# Patient Record
Sex: FEMALE | Race: WHITE
[De-identification: names, ages, dates, MRNs, and addresses within clinical notes are randomized per-mention and may not be internally consistent; named-entity substitution may affect disease eponyms.]

---

## 2019-04-29 ENCOUNTER — HOSPITAL ENCOUNTER (OUTPATIENT)
Dept: HOSPITAL 96 - M.SUR | Age: 49
Discharge: HOME | End: 2019-04-29
Attending: SURGERY
Payer: COMMERCIAL

## 2019-04-29 DIAGNOSIS — Z90.711: ICD-10-CM

## 2019-04-29 DIAGNOSIS — Z79.899: ICD-10-CM

## 2019-04-29 DIAGNOSIS — Z79.891: ICD-10-CM

## 2019-04-29 DIAGNOSIS — I10: ICD-10-CM

## 2019-04-29 DIAGNOSIS — D24.1: Primary | ICD-10-CM

## 2019-04-29 DIAGNOSIS — Z98.890: ICD-10-CM

## 2019-04-29 LAB
ALBUMIN SERPL-MCNC: 3.6 G/DL (ref 3.4–5)
ALP SERPL-CCNC: 57 U/L (ref 46–116)
ALT SERPL-CCNC: 22 U/L (ref 30–65)
ANION GAP SERPL CALC-SCNC: 9 MMOL/L (ref 7–16)
AST SERPL-CCNC: 11 U/L (ref 15–37)
BILIRUB SERPL-MCNC: 0.3 MG/DL
BUN SERPL-MCNC: 9 MG/DL (ref 7–18)
CALCIUM SERPL-MCNC: 8.6 MG/DL (ref 8.5–10.1)
CHLORIDE SERPL-SCNC: 106 MMOL/L (ref 98–107)
CO2 SERPL-SCNC: 24 MMOL/L (ref 21–32)
CREAT SERPL-MCNC: 0.8 MG/DL (ref 0.6–1.3)
GLUCOSE SERPL-MCNC: 115 MG/DL (ref 70–99)
HCT VFR BLD CALC: 41.7 % (ref 37–47)
HGB BLD-MCNC: 14.3 GM/DL (ref 12–15)
MCH RBC QN AUTO: 31 PG (ref 26–34)
MCHC RBC AUTO-ENTMCNC: 34.4 G/DL (ref 28–37)
MCV RBC: 90.2 FL (ref 80–100)
MPV: 8.2 FL. (ref 7.2–11.1)
PLATELET COUNT*: 240 THOU/UL (ref 150–400)
POTASSIUM SERPL-SCNC: 3.7 MMOL/L (ref 3.5–5.1)
PROT SERPL-MCNC: 7 G/DL (ref 6.4–8.2)
RBC # BLD AUTO: 4.63 MIL/UL (ref 4.2–5)
RDW-CV: 13 % (ref 10.5–14.5)
SODIUM SERPL-SCNC: 139 MMOL/L (ref 136–145)
WBC # BLD AUTO: 10.3 THOU/UL (ref 4–11)

## 2019-04-29 NOTE — EKG
[place] [address]
Phone:  [phone]                     ELECTROCARDIOGRAM REPORT      
_______________________________________________________________________________
 
Name:       YVESCARROL             Room:                      [facility]#:  [id number]      Account #:      [id number]  
Admission:  19     Attend Phys:    Qing Santiago MD   
Discharge:               Date of Birth:  [birth year]  
         Report #: [id number]
    [id number]
_______________________________________________________________________________
THIS REPORT FOR:  //name//                      
 
                          [facility]
                                       
Test Date:    2019               Test Time:    07:27:54
Pat Name:     JAMES MARINELLI             Department:   
Patient ID:   [id number]            Room:          
Gender:       F                        Technician:   [facility]
:          1970               Requested By: Qing Santiago
Order Number: [id number]    Reading MD:   Harshil Cordova
                                 Measurements
Intervals                              Axis          
Rate:         63                       P:            43
CA:           142                      QRS:          10
QRSD:         85                       T:            30
QT:           419                                    
QTc:          429                                    
                           Interpretive Statements
Sinus rhythm
Low voltage, precordial leads
Compared to ECG 2015 13:30:09
Low QRS voltage now present
Sinus bradycardia no longer present
 
Electronically Signed On 2019 12:20:56 CDT by Harshil Cordova
[email or web address]
 
 
 
 
 
 
 
 
 
 
 
 
 
 
 
 
 
  <ELECTRONICALLY SIGNED>
                                           By: Harshil Cordova MD, [facility]      
  19     1220
D: 04/29/19 07   _____________________________________
T: 19   Harshil Cordova MD, [facility]        /EPI

## 2019-04-29 NOTE — OP
[address]  [address]                    OPERATIVE REPORT              
_______________________________________________________________________________
 
Name:       YVESJAMES LAURA             Room:                      [facility]#:  [id number]      Account #:      [id number]  
Admission:  04/29/19     Attend Phys:    Qing Santiago MD   
Discharge:               Date of Birth:  [birth year]  
         Report #: [id number]
                                                                     [id number]  
_______________________________________________________________________________
THIS REPORT FOR:  //name//                      
 
CC: Almaz Santiago
 
DATE OF SERVICE:  04/29/2019
 
 
PREOPERATIVE DIAGNOSIS:  Right breast intraductal papilloma.
 
POSTOPERATIVE DIAGNOSIS:  Right breast intraductal papilloma.
 
PROCEDURE:  Right breast needle localized excision of papilloma.
 
SURGEON:  Qing Santiago MD.
 
ASSISTANT:  SCOTT Deng.
 
COMPLICATIONS:  None.
 
ANESTHESIA:  General anesthesia.
 
ESTIMATED BLOOD LOSS:  5 mL.
 
SPECIMENS REMOVED:  Right breast papilloma.
 
OTHER:  Incision 5 cm in length, 3 cm from nipple, 7 o'clock position,
periareolar.  Clip and the lesion in the mammographic specimen.
 
INDICATIONS:  The patient is a 48-year-old female with imaging in 03/14/2019 and
03/19/2019 showing a tubular hypoechoic focus of duct ectasia with a nonmobile
echo at 6 o'clock right breast 5 cm from nipple, also a lesion at the 8 o'clock,
5 cm from nipple.  Biopsy on 03/22/2019 at [facility] of 6 o'clock was
fibrocystic change and small sclerosing papilloma and 8 o'clock dilated duct
with other benign changes.  Risks and benefits of excision of the 6 o'clock
lesion, papilloma were discussed with the patient.
 
Indications for excision of papilloma were discussed with the patient and
recommendations to do so.  Risks and benefits for such were delineated in the H
and P and the patient agreed to proceed.
 
DESCRIPTION OF PROCEDURE:  The patient was brought to the operating room after
informed consent had been obtained.  Preoperatively, she was taken to mammogram
for wire localization of the 6 o'clock area.  The patient was then placed under
general anesthesia in the supine position with the right arm extended.  The
right breast and axilla were then prepped and draped in normal sterile manner. 
 
 
 
[place] [address]                    OPERATIVE REPORT              
_______________________________________________________________________________
 
Name:       JAMES MARINELLI             Room:                      [facility].#:  [id number]      Account #:      [id number]  
Admission:  04/29/19     Attend Phys:    Qing Santiago MD   
Discharge:               Date of Birth:  [birth year]  
         Report #: [id number]
                                                                     [id number]  
_______________________________________________________________________________
Prior to skin incision, a combination of 1% lidocaine plain and 0.5% Marcaine
with epinephrine was used.  A periareolar skin incision was made with a knife. 
This was deepened into the subcutaneous tissues using the Bovie electrocautery. 
A path was created toward the wire exit site in the superficial mastectomy plane
using the Bovie electrocautery.  Once the wire was identified, it was grasped
with 2 hemostats and brought into the incision.  The Bovie electrocautery was
then used to excise a lump of tissue surrounding the pathway of the wire.  The
biopsy site was encountered during dissection and the dissection was adjusted to
account for this.  Once completely removed, the specimen was labeled for
orientation purposes.  It was placed in the mammographic specimen tray and
handed for mammographic evaluation.  Mammogram did confirm that the lesion and
the clip were indeed within the specimen.  The wound bed was copiously irrigated
with normal saline, was noted to be adequately hemostatic.  The deeper tissues
were reapproximated with a single interrupted suture of 3-0 Vicryl.  The
Garcia's ligaments were released to take off skin puckering related to this
suture.  The deep dermal layers were then closed with interrupted 3-0 Vicryl
suture.  Skin was closed with 4-0 Monocryl in a subcuticular manner.  The wound
was dressed with Dermabond dressing.  The patient tolerated the procedure well. 
Sponge, lap and needle counts were correct x 2 at the end of the procedure.  She
was transferred to recovery in stable condition.
 
 
 
 
 
 
 
 
 
 
 
 
 
 
 
 
 
 
 
 
 
 
 
 
                       
                                        By:                                
                 
D: 04/29/19 1147_______________________________________
T: 04/29/19 1644Minsandeep Santiago MD               /nt

## 2019-05-01 NOTE — PATH
[address]  [address]                    PATHOLOGY RPT PROCEDURE       
_______________________________________________________________________________
 
Name:       MAKENNA MARINELLI MARIE             Room:                      [facility].R.#:  [id number]      Account #:      [id number]  
Admission:  04/29/19     Date of Birth:  [birth year]  
Discharge:                             Report #:    [id number]
                                                         Path Case #: [id number]
_______________________________________________________________________________
 
[facility] Accession Number: [id number]
.                                                                01
Material submitted:                                        .
breast - RIGHT BREAST PAPILLOMA, LONG LATERAL SHORT SUPERIOR DOUBLE DEEP.
Modifiers: right
.                                                                01
Clinical history:                                          .
Right breast papilloma
.                                                                02
**********************************************************************
Diagnosis:
Right breast papilloma:
- Benign breast tissue with residual sclerosing papilloma associated with
calcification and adjacent to changes of prior biopsy, negative for
atypia.  See comment.
(WANDA:pit 05/01/2019)
QTP/05/01/2019
**********************************************************************
.                                                                02
Comment:
The papilloma appears to be completely removed.
(WANDA:pit 05/01/2019)
.                                                                02
Electronically signed:                                     .
Shane Crain MD, Pathologist
NPI- [id number]
.                                                                01
Gross description:                                         .
The specimen is received in formalin, labeled "Makenna Marinelli, right
breast papilloma, long lateral, short superior, double deep", is a 7 g,
oriented fibroadipose tissue with sutures: short = superior, long =
lateral and double = deep.  The specimen measures medial to lateral = 3.5
cm, superior to inferior = 2.2 cm and superficial to deep = 1.3 cm.  The
specimen is inked as follows: Superior = red, inferior = green, medial =
orange, lateral = yellow, superficial = blue and deep = black.  The
specimen is serially sectioned from medial to lateral into 11 slices.
(Slice #1 medial and slice #11 lateral margins) to reveal a tan-brown,
focally hemorrhagic, indurated mass with a possible previous biopsy site
approximately measuring 3.0 x 2.2 x 1.3 cm (slice #3 to slice #11).  The
lesion abuts superficial, deep, superior, inferior, and lateral margins and
is 0.6 cm from the medial margin.  The remaining parenchyma is yellow and
fatty.
.
The specimen is entirely submitted as follows:
A1.  Slice 1, medial margin, perpendicular sectioned
A2-A9.  Midportion, contiguous sections (slice #2 to #10) with possible
previous biopsy site in A5-A6.
A10.  Slice 11, lateral margin, perpendicular [place] [address]                    PATHOLOGY RPT PROCEDURE       
_______________________________________________________________________________
 
Name:       MAKENNA MARINELLI             Room:                      [facility]#:  [id number]      Account #:      [id number]  
Admission:  04/29/19     Date of Birth:  [birth year]  
Discharge:                             Report #:    [id number]
                                                         Path Case #: [id number]
_______________________________________________________________________________
.
Specimen excised at: Not provided, placed in formalin at: Not provided,
formalin exposure: Cannot be determined.
(SWS; 4/30/2019)
SHS/SHS
.                                                                02
Pathologist provided ICD-10:
D24.1
.                                                                02
CPT                                                        .
651080
Specimen Comment: A courtesy copy of this report has been sent to
Specimen Comment: [phone], [phone].
Specimen Comment: Report sent to  / DR WEIR
***Performed at:  01
   [facility]
   [address], [place]  [id number]
   MD Camacho Block MD Phone:  [phone]
***Performed at:  02
   [facility] [address] [place] [place], [place]  [id number]
   MD Shane Crain MD Phone:  [phone]

## 2020-02-28 ENCOUNTER — HOSPITAL ENCOUNTER (EMERGENCY)
Dept: HOSPITAL 96 - M.ERS | Age: 50
Discharge: HOME | End: 2020-02-28
Payer: COMMERCIAL

## 2020-02-28 VITALS — BODY MASS INDEX: 28.52 KG/M2 | WEIGHT: 155.01 LBS | HEIGHT: 62 IN

## 2020-02-28 VITALS — DIASTOLIC BLOOD PRESSURE: 99 MMHG | SYSTOLIC BLOOD PRESSURE: 181 MMHG
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
## 2020-02-28 DIAGNOSIS — S06.0X0A: Primary | ICD-10-CM
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
## 2020-02-28 DIAGNOSIS — Z90.711: ICD-10-CM

## 2020-02-28 DIAGNOSIS — Z98.890: ICD-10-CM
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
## 2020-02-28 DIAGNOSIS — S30.0XXA: ICD-10-CM

## 2020-02-28 DIAGNOSIS — F17.210: ICD-10-CM

## 2021-07-07 ENCOUNTER — HOSPITAL ENCOUNTER (OUTPATIENT)
Dept: HOSPITAL 96 - M.RAD | Age: 51
End: 2021-07-07
Attending: NURSE PRACTITIONER
Payer: COMMERCIAL

## 2021-07-07 DIAGNOSIS — Z12.31: Primary | ICD-10-CM
